# Patient Record
Sex: FEMALE | Race: WHITE | NOT HISPANIC OR LATINO | URBAN - METROPOLITAN AREA
[De-identification: names, ages, dates, MRNs, and addresses within clinical notes are randomized per-mention and may not be internally consistent; named-entity substitution may affect disease eponyms.]

---

## 2020-07-25 ENCOUNTER — TELEPHONE ENCOUNTER (OUTPATIENT)
Dept: URBAN - METROPOLITAN AREA CLINIC 13 | Facility: CLINIC | Age: 85
End: 2020-07-25

## 2020-07-25 RX ORDER — PANTOPRAZOLE SODIUM 20 MG
TAKE 1 TABLET DAILY TABLET, DELAYED RELEASE (ENTERIC COATED) ORAL
Refills: 0 | OUTPATIENT
Start: 2011-08-16 | End: 2011-10-13

## 2020-07-25 RX ORDER — LORAZEPAM 2 MG/1
TAKE  TABLET  1/2 QD TABLET ORAL
Refills: 0 | OUTPATIENT
Start: 2010-08-26 | End: 2011-10-13

## 2020-07-25 RX ORDER — PANTOPRAZOLE SODIUM 40 MG/1
TAKE 1 TABLET BY MOUTH ONCE DAILY TABLET, DELAYED RELEASE ORAL
Qty: 30 | Refills: 11 | OUTPATIENT
Start: 2016-12-29 | End: 2019-02-25

## 2020-07-25 RX ORDER — LISINOPRIL 20 MG/1
TAKE 1 TABLET DAILY TABLET ORAL
Refills: 0 | OUTPATIENT
End: 2016-02-19

## 2020-07-25 RX ORDER — CALCIUM CITRATE/VITAMIN D3 315MG-6.25
TAKE 1 TABLET DAILY TABLET ORAL
Refills: 0 | OUTPATIENT
Start: 2011-08-16 | End: 2011-10-13

## 2020-07-25 RX ORDER — CHLORDIAZEPOXIDE HYDROCHLORIDE AND CLIDINIUM BROMIDE 5; 2.5 MG/1; MG/1
TAKE 1 CAPSULE 3 TIMES DAILY AS NEEDED CAPSULE ORAL
Refills: 0 | OUTPATIENT
Start: 2008-11-17 | End: 2008-11-17

## 2020-07-25 RX ORDER — PREDNISONE 50 MG/1
TAKE AS DIRECTED TABLET ORAL
Refills: 0 | OUTPATIENT
Start: 2011-07-11 | End: 2011-10-13

## 2020-07-25 RX ORDER — FAMOTIDINE 40 MG/1
TAKE 1 TABLET DAILY TABLET ORAL
Refills: 0 | OUTPATIENT
End: 2016-02-19

## 2020-07-25 RX ORDER — ATENOLOL 50 MG
TAKE 1 TABLET DAILY TABLET ORAL
Refills: 0 | OUTPATIENT
Start: 2007-10-12 | End: 2008-09-24

## 2020-07-25 RX ORDER — LORAZEPAM 1 MG/1
TABLET ORAL
Qty: 30 | Refills: 0 | OUTPATIENT
Start: 2013-09-04 | End: 2016-02-19

## 2020-07-25 RX ORDER — FAMOTIDINE 40 MG/1
TAKE 1 TABLET TWICE DAILY TABLET, FILM COATED ORAL
Refills: 0 | OUTPATIENT
End: 2014-01-09

## 2020-07-25 RX ORDER — PANTOPRAZOLE SODIUM 40 MG/1
TAKE 1 TABLET DAILY TABLET, DELAYED RELEASE ORAL
Qty: 30 | Refills: 11 | OUTPATIENT
Start: 2012-07-10 | End: 2013-09-20

## 2020-07-25 RX ORDER — PANTOPRAZOLE SODIUM 40 MG/1
TAKE ONE TABLET DAILY TABLET, DELAYED RELEASE ORAL
Qty: 30 | Refills: 11 | OUTPATIENT
Start: 2013-01-18 | End: 2016-02-19

## 2020-07-25 RX ORDER — PANTOPRAZOLE SODIUM 40 MG
TAKE 1 TABLET DAILY TABLET, DELAYED RELEASE (ENTERIC COATED) ORAL
Qty: 30 | Refills: 11 | OUTPATIENT
End: 2016-09-08

## 2020-07-25 RX ORDER — DOXEPIN HYDROCHLORIDE 75 MG/1
TAKE 1 CAPSULE DAILY CAPSULE ORAL
Refills: 0 | OUTPATIENT
Start: 2006-11-01 | End: 2007-10-12

## 2020-07-25 RX ORDER — METOCLOPRAMIDE HYDROCHLORIDE 5 MG/1
TAKE 1 TABLET AS NEEDED TABLET ORAL
Refills: 0 | OUTPATIENT
Start: 2011-01-31 | End: 2011-01-31

## 2020-07-26 ENCOUNTER — TELEPHONE ENCOUNTER (OUTPATIENT)
Dept: URBAN - METROPOLITAN AREA CLINIC 13 | Facility: CLINIC | Age: 85
End: 2020-07-26

## 2020-07-26 RX ORDER — BUTALBITAL, ACETAMINOPHEN AND CAFFEINE 50; 325; 40 MG/1; MG/1; MG/1
TK 1 C PO  Q 6 H PRN CAPSULE ORAL
Qty: 30 | Refills: 0 | Status: ACTIVE | COMMUNITY
Start: 2018-12-13

## 2020-07-26 RX ORDER — DIAZEPAM 2 MG/1
TAKE 1 TABLET 3-4 TIMES DAILY TABLET ORAL
Refills: 0 | Status: ACTIVE | COMMUNITY

## 2020-07-26 RX ORDER — ONDANSETRON 4 MG/1
DISSOLVE 1-2 TABLETS UNDER THE TONGUE EVERY 8 HOURS AS NEEDED FOR NAUSEA TABLET, ORALLY DISINTEGRATING ORAL
Qty: 45 | Refills: 4 | Status: ACTIVE | COMMUNITY
Start: 2017-07-21

## 2020-07-26 RX ORDER — HYDROCODONE/ACETAMINOPHEN 10MG-325MG
TAKE 1 TABLET EVERY 4 HOURS AS NEEDED FOR PAIN TABLET ORAL
Refills: 0 | Status: ACTIVE | COMMUNITY

## 2020-07-26 RX ORDER — FAMOTIDINE 40 MG/1
TAKE 1 TABLET BEDTIME PRN INDIGESTION OR HEARTBURN TABLET ORAL
Qty: 30 | Refills: 11 | Status: ACTIVE | COMMUNITY
Start: 2016-09-08

## 2020-07-26 RX ORDER — HYDROCHLOROTHIAZIDE 12.5 MG/1
CAPSULE ORAL
Qty: 30 | Refills: 0 | Status: ACTIVE | COMMUNITY
Start: 2018-05-01

## 2020-07-26 RX ORDER — LUBIPROSTONE 24 UG/1
TAKE 1 CAPSULE BY MOUTH TWICE A DAY WITH FOOD CAPSULE, GELATIN COATED ORAL
Qty: 60 | Refills: 11 | Status: ACTIVE | COMMUNITY
Start: 2016-09-08

## 2020-07-26 RX ORDER — HYDROCHLOROTHIAZIDE 12.5 MG/1
TAKE 1 TABLET DAILY IN THE MORNING TABLET ORAL
Refills: 0 | Status: ACTIVE | COMMUNITY

## 2020-07-26 RX ORDER — PANTOPRAZOLE SODIUM 40 MG/1
TAKE 1 TABLET BY MOUTH DAILY TABLET, DELAYED RELEASE ORAL
Qty: 90 | Refills: 3 | Status: ACTIVE | COMMUNITY
Start: 2019-02-25

## 2020-07-26 RX ORDER — DOXYCYCLINE HYCLATE 50 MG/1
CAPSULE ORAL
Qty: 14 | Refills: 0 | Status: ACTIVE | COMMUNITY
Start: 2019-01-23

## 2020-07-26 RX ORDER — LISINOPRIL 20 MG/1
TAKE 1 TABLET DAILY TABLET ORAL
Qty: 30 | Refills: 0 | Status: ACTIVE | COMMUNITY

## 2020-08-31 ENCOUNTER — ERX REFILL RESPONSE (OUTPATIENT)
Age: 85
End: 2020-08-31

## 2020-08-31 RX ORDER — ONDANSETRON 4 MG/1
DISSOLVE 1-2 TABLETS UNDER THE TONGUE EVERY 8 HOURS AS NEEDED FOR NAUSEA TABLET, ORALLY DISINTEGRATING ORAL
Qty: 45 | Refills: 0

## 2020-09-28 ENCOUNTER — ERX REFILL RESPONSE (OUTPATIENT)
Age: 85
End: 2020-09-28

## 2020-09-28 RX ORDER — FAMOTIDINE 40 MG/1
TAKE 1 TABLET BY MOUTH TWICE DAILY TABLET, FILM COATED ORAL
Qty: 180 | Refills: 0

## 2020-12-15 ENCOUNTER — TELEPHONE ENCOUNTER (OUTPATIENT)
Dept: URBAN - METROPOLITAN AREA CLINIC 113 | Facility: CLINIC | Age: 85
End: 2020-12-15

## 2020-12-15 RX ORDER — ONDANSETRON 4 MG/1
DISSOLVE 1-2 TABLETS UNDER THE TONGUE EVERY 8 HOURS AS NEEDED FOR NAUSEA TABLET, ORALLY DISINTEGRATING ORAL
Qty: 30 | Refills: 1

## 2021-04-29 ENCOUNTER — WEB ENCOUNTER (OUTPATIENT)
Dept: URBAN - METROPOLITAN AREA CLINIC 113 | Facility: CLINIC | Age: 86
End: 2021-04-29

## 2021-04-29 ENCOUNTER — OFFICE VISIT (OUTPATIENT)
Dept: URBAN - METROPOLITAN AREA CLINIC 113 | Facility: CLINIC | Age: 86
End: 2021-04-29
Payer: MEDICARE

## 2021-04-29 VITALS
SYSTOLIC BLOOD PRESSURE: 183 MMHG | TEMPERATURE: 98 F | HEIGHT: 61 IN | HEART RATE: 67 BPM | RESPIRATION RATE: 20 BRPM | BODY MASS INDEX: 22.28 KG/M2 | WEIGHT: 118 LBS | DIASTOLIC BLOOD PRESSURE: 79 MMHG

## 2021-04-29 DIAGNOSIS — R19.7 DIARRHEA: ICD-10-CM

## 2021-04-29 DIAGNOSIS — R15.9 INCONTINENCE OF FECES: ICD-10-CM

## 2021-04-29 PROCEDURE — 99214 OFFICE O/P EST MOD 30 MIN: CPT | Performed by: INTERNAL MEDICINE

## 2021-04-29 RX ORDER — DICYCLOMINE HYDROCHLORIDE 10 MG/1
1 TABLET CAPSULE ORAL THREE TIMES A DAY
Status: ACTIVE | COMMUNITY

## 2021-04-29 RX ORDER — HYDROCHLOROTHIAZIDE 12.5 MG/1
CAPSULE ORAL
Qty: 30 | Refills: 0 | Status: DISCONTINUED | COMMUNITY
Start: 2018-05-01

## 2021-04-29 RX ORDER — HYDROCODONE/ACETAMINOPHEN 10MG-325MG
TAKE 1 TABLET EVERY 4 HOURS AS NEEDED FOR PAIN TABLET ORAL
Refills: 0 | Status: ACTIVE | COMMUNITY

## 2021-04-29 RX ORDER — ONDANSETRON 4 MG/1
DISSOLVE 1-2 TABLETS UNDER THE TONGUE EVERY 8 HOURS AS NEEDED FOR NAUSEA TABLET, ORALLY DISINTEGRATING ORAL
Qty: 30 | Refills: 1 | Status: ACTIVE | COMMUNITY

## 2021-04-29 RX ORDER — PANTOPRAZOLE SODIUM 40 MG/1
TAKE 1 TABLET BY MOUTH DAILY TABLET, DELAYED RELEASE ORAL
Qty: 90 | Refills: 3 | Status: ACTIVE | COMMUNITY
Start: 2019-02-25

## 2021-04-29 RX ORDER — BUTALBITAL, ACETAMINOPHEN AND CAFFEINE 50; 325; 40 MG/1; MG/1; MG/1
TK 1 C PO  Q 6 H PRN CAPSULE ORAL
Qty: 30 | Refills: 0 | Status: ACTIVE | COMMUNITY
Start: 2018-12-13

## 2021-04-29 RX ORDER — DOXYCYCLINE HYCLATE 50 MG/1
CAPSULE ORAL
Qty: 14 | Refills: 0 | Status: DISCONTINUED | COMMUNITY
Start: 2019-01-23

## 2021-04-29 RX ORDER — ATENOLOL 25 MG/1
1 TABLET TABLET ORAL ONCE A DAY
Status: ACTIVE | COMMUNITY

## 2021-04-29 RX ORDER — FAMOTIDINE 40 MG/1
TAKE 1 TABLET BY MOUTH TWICE DAILY TABLET, FILM COATED ORAL
Qty: 180 | Refills: 0 | Status: ACTIVE | COMMUNITY

## 2021-04-29 RX ORDER — HYDROCHLOROTHIAZIDE 12.5 MG/1
TAKE 1 TABLET DAILY IN THE MORNING TABLET ORAL
Refills: 0 | Status: DISCONTINUED | COMMUNITY

## 2021-04-29 RX ORDER — LUBIPROSTONE 24 UG/1
TAKE 1 CAPSULE BY MOUTH TWICE A DAY WITH FOOD CAPSULE, GELATIN COATED ORAL
Qty: 60 | Refills: 11 | Status: DISCONTINUED | COMMUNITY
Start: 2016-09-08

## 2021-04-29 RX ORDER — DIAZEPAM 2 MG/1
TAKE 1 TABLET 3-4 TIMES DAILY TABLET ORAL
Refills: 0 | Status: DISCONTINUED | COMMUNITY

## 2021-04-29 RX ORDER — LISINOPRIL 20 MG/1
TAKE 1 TABLET DAILY TABLET ORAL
Qty: 30 | Refills: 0 | Status: ACTIVE | COMMUNITY

## 2021-04-29 NOTE — HPI-OTHER HISTORIES
Barium swallow 11/6/18: revealed mild esophageal dysmotility. Colonoscopy 2011: Excellent prep, sigmoid diverticulosis, moderate nonbleeding internal hemorrhoids. EGD 2013: Benign-appearing distal esophageal ring status post dilatation 46 [place], mild distal esophageal concentric rings, multiple fundic gland polyps, small hiatal hernia, otherwise normal to the second part of the duodenum. Esophageal biopsies negative for intestinal metaplasia. Gastric biopsies negative for H. pylori.

## 2021-04-29 NOTE — HPI-TODAY'S VISIT:
89-year-old female with history of GERD, constipation predominant irritable bowel syndrome, presenting for evaluation of diarrhea. She was last seen 2/25/2019 for follow-up regarding GERD and nausea, which were managed with pantoprazole and famotidine, with Zofran as needed.  Her chronic constipation was managed with milk of magnesia.  Dicyclomine was provided to use as needed for relief of abdominal pain. She returns today with complaint of a 3 week history of diarrhea. She is having 5-6 watery stools per day with associated nocturnal defecation. She complains of frequent fecal leakage, requiring her to wear a pad. No blood per rectum. She has diffuse abdominal discomfort prior to a bowel movement, which subsides with defecation. She has occasional nausea without vomiting. She discontinued her milk of magnesia at onset, and has taken Imodium 3x without improvement. She started oral Augmentin and topical erythromycin one week ago for an eye infection, but the diarrhea began prior to iniitation of the antibiotics. She otherwise denies recent medication changes or sick contacts. Her  accompanies her to the visit.

## 2021-05-05 ENCOUNTER — TELEPHONE ENCOUNTER (OUTPATIENT)
Dept: URBAN - METROPOLITAN AREA CLINIC 113 | Facility: CLINIC | Age: 86
End: 2021-05-05

## 2021-05-05 LAB
ADENOVIRUS F 40/41: NOT DETECTED
ASTROVIRUS: NOT DETECTED
C DIFFICILE TOXIN A/B: NOT DETECTED
CALPROTECTIN, FECAL: <16
CAMPYLOBACTER: NOT DETECTED
CRYPTOSPORIDIUM: NOT DETECTED
CYCLOSPORA CAYETANENSIS: NOT DETECTED
E COLI O157: (no result)
ENTAMOEBA HISTOLYTICA: NOT DETECTED
ENTEROAGGREGATIVE E COLI: NOT DETECTED
ENTEROPATHOGENIC E COLI: NOT DETECTED
ENTEROTOXIGENIC E COLI: NOT DETECTED
GIARDIA LAMBLIA: NOT DETECTED
NOROVIRUS GI/GII: NOT DETECTED
PLESIOMONAS SHIGELLOIDES: NOT DETECTED
ROTAVIRUS A: NOT DETECTED
SALMONELLA: NOT DETECTED
SAPOVIRUS: NOT DETECTED
SHIGA-TOXIN-PRODUCING E COLI: NOT DETECTED
SHIGELLA/ENTEROINVASIVE E COLI: NOT DETECTED
VIBRIO CHOLERAE: NOT DETECTED
VIBRIO: NOT DETECTED
YERSINIA ENTEROCOLITICA: NOT DETECTED

## 2021-05-05 RX ORDER — FAMOTIDINE 40 MG/1
TAKE 1 TABLET BY MOUTH TWICE DAILY TABLET, FILM COATED ORAL
Qty: 180 | Refills: 0 | Status: ACTIVE | COMMUNITY

## 2021-05-05 RX ORDER — PANTOPRAZOLE SODIUM 40 MG/1
TAKE 1 TABLET BY MOUTH DAILY TABLET, DELAYED RELEASE ORAL
Qty: 90 | Refills: 3 | Status: ACTIVE | COMMUNITY
Start: 2019-02-25

## 2021-05-05 RX ORDER — ATENOLOL 25 MG/1
1 TABLET TABLET ORAL ONCE A DAY
Status: ACTIVE | COMMUNITY

## 2021-05-05 RX ORDER — HYDROCODONE/ACETAMINOPHEN 10MG-325MG
TAKE 1 TABLET EVERY 4 HOURS AS NEEDED FOR PAIN TABLET ORAL
Refills: 0 | Status: ACTIVE | COMMUNITY

## 2021-05-05 RX ORDER — LISINOPRIL 20 MG/1
TAKE 1 TABLET DAILY TABLET ORAL
Qty: 30 | Refills: 0 | Status: ACTIVE | COMMUNITY

## 2021-05-05 RX ORDER — BUTALBITAL, ACETAMINOPHEN AND CAFFEINE 50; 325; 40 MG/1; MG/1; MG/1
TK 1 C PO  Q 6 H PRN CAPSULE ORAL
Qty: 30 | Refills: 0 | Status: ACTIVE | COMMUNITY
Start: 2018-12-13

## 2021-05-05 RX ORDER — SODIUM, POTASSIUM,MAG SULFATES 17.5-3.13G
354 ML SOLUTION, RECONSTITUTED, ORAL ORAL ONCE
Qty: 354 MILLILITER | Refills: 0 | OUTPATIENT
Start: 2021-05-05 | End: 2021-05-06

## 2021-05-05 RX ORDER — ONDANSETRON 4 MG/1
DISSOLVE 1-2 TABLETS UNDER THE TONGUE EVERY 8 HOURS AS NEEDED FOR NAUSEA TABLET, ORALLY DISINTEGRATING ORAL
Qty: 30 | Refills: 1 | Status: ACTIVE | COMMUNITY

## 2021-05-05 RX ORDER — DICYCLOMINE HYDROCHLORIDE 10 MG/1
1 TABLET CAPSULE ORAL THREE TIMES A DAY
Status: ACTIVE | COMMUNITY

## 2021-05-12 ENCOUNTER — TELEPHONE ENCOUNTER (OUTPATIENT)
Dept: URBAN - METROPOLITAN AREA CLINIC 113 | Facility: CLINIC | Age: 86
End: 2021-05-12

## 2021-05-12 RX ORDER — DICYCLOMINE HYDROCHLORIDE 10 MG/1
1 TABLET CAPSULE ORAL
Qty: 45 | Refills: 2

## 2021-05-13 ENCOUNTER — TELEPHONE ENCOUNTER (OUTPATIENT)
Dept: URBAN - METROPOLITAN AREA CLINIC 72 | Facility: CLINIC | Age: 86
End: 2021-05-13

## 2021-05-13 RX ORDER — ONDANSETRON 4 MG/1
PLACE 1 TABLET ON THE TONGUE AND ALLOW TO DISSOLVE, EVERY 4-6 HOURS AS NEEDED FOR NAUSEA TABLET, ORALLY DISINTEGRATING ORAL
Qty: 45 TABLET | Refills: 1 | OUTPATIENT
Start: 2021-05-14

## 2021-05-26 ENCOUNTER — TELEPHONE ENCOUNTER (OUTPATIENT)
Dept: URBAN - METROPOLITAN AREA SURGERY CENTER 25 | Facility: SURGERY CENTER | Age: 86
End: 2021-05-26

## 2021-05-28 ENCOUNTER — CLAIMS CREATED FROM THE CLAIM WINDOW (OUTPATIENT)
Dept: URBAN - METROPOLITAN AREA CLINIC 4 | Facility: CLINIC | Age: 86
End: 2021-05-28
Payer: MEDICARE

## 2021-05-28 ENCOUNTER — TELEPHONE ENCOUNTER (OUTPATIENT)
Dept: URBAN - METROPOLITAN AREA CLINIC 113 | Facility: CLINIC | Age: 86
End: 2021-05-28

## 2021-05-28 ENCOUNTER — OFFICE VISIT (OUTPATIENT)
Dept: URBAN - METROPOLITAN AREA SURGERY CENTER 25 | Facility: SURGERY CENTER | Age: 86
End: 2021-05-28
Payer: MEDICARE

## 2021-05-28 DIAGNOSIS — K63.89 JEJUNAL POLYP: ICD-10-CM

## 2021-05-28 DIAGNOSIS — K52.9 CHRONIC DIARRHEA: ICD-10-CM

## 2021-05-28 PROCEDURE — G8907 PT DOC NO EVENTS ON DISCHARG: HCPCS | Performed by: INTERNAL MEDICINE

## 2021-05-28 PROCEDURE — 88305 TISSUE EXAM BY PATHOLOGIST: CPT | Performed by: PATHOLOGY

## 2021-05-28 PROCEDURE — 45380 COLONOSCOPY AND BIOPSY: CPT | Performed by: INTERNAL MEDICINE

## 2021-05-28 RX ORDER — ONDANSETRON 4 MG/1
PLACE 1 TABLET ON THE TONGUE AND ALLOW TO DISSOLVE, EVERY 4-6 HOURS AS NEEDED FOR NAUSEA TABLET, ORALLY DISINTEGRATING ORAL
Qty: 45 TABLET | Refills: 1 | Status: ACTIVE | COMMUNITY
Start: 2021-05-14

## 2021-05-28 RX ORDER — DICYCLOMINE HYDROCHLORIDE 10 MG/1
1 TABLET CAPSULE ORAL
Qty: 45 | Refills: 2 | Status: ACTIVE | COMMUNITY

## 2021-05-28 RX ORDER — BUTALBITAL, ACETAMINOPHEN AND CAFFEINE 50; 325; 40 MG/1; MG/1; MG/1
TK 1 C PO  Q 6 H PRN CAPSULE ORAL
Qty: 30 | Refills: 0 | Status: ACTIVE | COMMUNITY
Start: 2018-12-13

## 2021-05-28 RX ORDER — LISINOPRIL 20 MG/1
TAKE 1 TABLET DAILY TABLET ORAL
Qty: 30 | Refills: 0 | Status: ACTIVE | COMMUNITY

## 2021-05-28 RX ORDER — ONDANSETRON 4 MG/1
DISSOLVE 1-2 TABLETS UNDER THE TONGUE EVERY 8 HOURS AS NEEDED FOR NAUSEA TABLET, ORALLY DISINTEGRATING ORAL
Qty: 30 | Refills: 1 | Status: ACTIVE | COMMUNITY

## 2021-05-28 RX ORDER — PANTOPRAZOLE SODIUM 40 MG/1
TAKE 1 TABLET BY MOUTH DAILY TABLET, DELAYED RELEASE ORAL
Qty: 90 | Refills: 3 | Status: ACTIVE | COMMUNITY
Start: 2019-02-25

## 2021-05-28 RX ORDER — FAMOTIDINE 40 MG/1
TAKE 1 TABLET BY MOUTH TWICE DAILY TABLET, FILM COATED ORAL
Qty: 180 | Refills: 0 | Status: ACTIVE | COMMUNITY

## 2021-05-28 RX ORDER — HYDROCODONE/ACETAMINOPHEN 10MG-325MG
TAKE 1 TABLET EVERY 4 HOURS AS NEEDED FOR PAIN TABLET ORAL
Refills: 0 | Status: ACTIVE | COMMUNITY

## 2021-05-28 RX ORDER — ATENOLOL 25 MG/1
1 TABLET TABLET ORAL ONCE A DAY
Status: ACTIVE | COMMUNITY

## 2021-06-10 ENCOUNTER — OFFICE VISIT (OUTPATIENT)
Dept: URBAN - METROPOLITAN AREA CLINIC 113 | Facility: CLINIC | Age: 86
End: 2021-06-10

## 2021-06-10 RX ORDER — ONDANSETRON 4 MG/1
DISSOLVE 1-2 TABLETS UNDER THE TONGUE EVERY 8 HOURS AS NEEDED FOR NAUSEA TABLET, ORALLY DISINTEGRATING ORAL
Qty: 30 | Refills: 1 | Status: ACTIVE | COMMUNITY

## 2021-06-10 RX ORDER — DICYCLOMINE HYDROCHLORIDE 10 MG/1
1 TABLET CAPSULE ORAL
Qty: 45 | Refills: 2 | Status: ACTIVE | COMMUNITY

## 2021-06-10 RX ORDER — ONDANSETRON 4 MG/1
PLACE 1 TABLET ON THE TONGUE AND ALLOW TO DISSOLVE, EVERY 4-6 HOURS AS NEEDED FOR NAUSEA TABLET, ORALLY DISINTEGRATING ORAL
Qty: 45 TABLET | Refills: 1 | Status: ACTIVE | COMMUNITY
Start: 2021-05-14

## 2021-06-10 RX ORDER — BUTALBITAL, ACETAMINOPHEN AND CAFFEINE 50; 325; 40 MG/1; MG/1; MG/1
TK 1 C PO  Q 6 H PRN CAPSULE ORAL
Qty: 30 | Refills: 0 | Status: ACTIVE | COMMUNITY
Start: 2018-12-13

## 2021-06-10 RX ORDER — LISINOPRIL 20 MG/1
TAKE 1 TABLET DAILY TABLET ORAL
Qty: 30 | Refills: 0 | Status: ACTIVE | COMMUNITY

## 2021-06-10 RX ORDER — FAMOTIDINE 40 MG/1
TAKE 1 TABLET BY MOUTH TWICE DAILY TABLET, FILM COATED ORAL
Qty: 180 | Refills: 0 | Status: ACTIVE | COMMUNITY

## 2021-06-10 RX ORDER — ATENOLOL 25 MG/1
1 TABLET TABLET ORAL ONCE A DAY
Status: ACTIVE | COMMUNITY

## 2021-06-10 RX ORDER — HYDROCODONE/ACETAMINOPHEN 10MG-325MG
TAKE 1 TABLET EVERY 4 HOURS AS NEEDED FOR PAIN TABLET ORAL
Refills: 0 | Status: ACTIVE | COMMUNITY

## 2021-06-10 RX ORDER — PANTOPRAZOLE SODIUM 40 MG/1
TAKE 1 TABLET BY MOUTH DAILY TABLET, DELAYED RELEASE ORAL
Qty: 90 | Refills: 3 | Status: ACTIVE | COMMUNITY
Start: 2019-02-25

## 2021-06-11 ENCOUNTER — OFFICE VISIT (OUTPATIENT)
Dept: URBAN - METROPOLITAN AREA CLINIC 107 | Facility: CLINIC | Age: 86
End: 2021-06-11

## 2021-06-14 ENCOUNTER — OFFICE VISIT (OUTPATIENT)
Dept: URBAN - METROPOLITAN AREA CLINIC 113 | Facility: CLINIC | Age: 86
End: 2021-06-14
Payer: MEDICARE

## 2021-06-14 VITALS
HEIGHT: 61 IN | DIASTOLIC BLOOD PRESSURE: 77 MMHG | BODY MASS INDEX: 21.79 KG/M2 | WEIGHT: 115.4 LBS | TEMPERATURE: 97.7 F | RESPIRATION RATE: 22 BRPM | SYSTOLIC BLOOD PRESSURE: 179 MMHG | HEART RATE: 60 BPM

## 2021-06-14 DIAGNOSIS — K21.9 GERD WITHOUT ESOPHAGITIS: ICD-10-CM

## 2021-06-14 DIAGNOSIS — K58.1 IRRITABLE BOWEL SYNDROME WITH CONSTIPATION: ICD-10-CM

## 2021-06-14 DIAGNOSIS — K22.2 ESOPHAGEAL RING: ICD-10-CM

## 2021-06-14 DIAGNOSIS — R19.7 DIARRHEA: ICD-10-CM

## 2021-06-14 DIAGNOSIS — R15.9 INCONTINENCE OF FECES: ICD-10-CM

## 2021-06-14 PROBLEM — 266435005: Status: ACTIVE | Noted: 2021-06-14

## 2021-06-14 PROBLEM — 72042002 INCONTINENCE OF FECES: Status: ACTIVE | Noted: 2021-04-29

## 2021-06-14 PROBLEM — 300286002: Status: ACTIVE | Noted: 2021-06-14

## 2021-06-14 PROBLEM — 62315008 DIARRHEA: Status: ACTIVE | Noted: 2021-05-28

## 2021-06-14 PROBLEM — 440630006: Status: ACTIVE | Noted: 2021-06-14

## 2021-06-14 PROCEDURE — 99213 OFFICE O/P EST LOW 20 MIN: CPT | Performed by: INTERNAL MEDICINE

## 2021-06-14 RX ORDER — ONDANSETRON 4 MG/1
PLACE 1 TABLET ON THE TONGUE AND ALLOW TO DISSOLVE, EVERY 4-6 HOURS AS NEEDED FOR NAUSEA TABLET, ORALLY DISINTEGRATING ORAL
Qty: 45 TABLET | Refills: 1 | Status: ACTIVE | COMMUNITY
Start: 2021-05-14

## 2021-06-14 RX ORDER — HYDROCODONE/ACETAMINOPHEN 10MG-325MG
TAKE 1 TABLET EVERY 4 HOURS AS NEEDED FOR PAIN TABLET ORAL
Refills: 0 | Status: ACTIVE | COMMUNITY

## 2021-06-14 RX ORDER — DICYCLOMINE HYDROCHLORIDE 10 MG/1
1 TABLET CAPSULE ORAL
Qty: 45 | Refills: 2 | Status: ACTIVE | COMMUNITY

## 2021-06-14 RX ORDER — BUTALBITAL, ACETAMINOPHEN AND CAFFEINE 50; 325; 40 MG/1; MG/1; MG/1
TK 1 C PO  Q 6 H PRN CAPSULE ORAL
Qty: 30 | Refills: 0 | Status: ACTIVE | COMMUNITY
Start: 2018-12-13

## 2021-06-14 RX ORDER — ONDANSETRON 4 MG/1
DISSOLVE 1-2 TABLETS UNDER THE TONGUE EVERY 8 HOURS AS NEEDED FOR NAUSEA TABLET, ORALLY DISINTEGRATING ORAL
Qty: 30 | Refills: 1 | Status: ACTIVE | COMMUNITY

## 2021-06-14 RX ORDER — FAMOTIDINE 40 MG/1
TAKE 1 TABLET BY MOUTH TWICE DAILY TABLET, FILM COATED ORAL
Qty: 180 | Refills: 0 | Status: ON HOLD | COMMUNITY

## 2021-06-14 RX ORDER — LISINOPRIL 20 MG/1
TAKE 1 TABLET DAILY TABLET ORAL
Qty: 30 | Refills: 0 | Status: ACTIVE | COMMUNITY

## 2021-06-14 RX ORDER — ATENOLOL 25 MG/1
1 TABLET TABLET ORAL ONCE A DAY
Status: ACTIVE | COMMUNITY

## 2021-06-14 RX ORDER — PANTOPRAZOLE SODIUM 40 MG/1
TAKE 1 TABLET BY MOUTH DAILY TABLET, DELAYED RELEASE ORAL
Qty: 90 | Refills: 3 | Status: ACTIVE | COMMUNITY
Start: 2019-02-25

## 2021-06-14 NOTE — HPI-TODAY'S VISIT:
This is a 89-year-old female with a history of GERD, constipation predominant irritable bowel syndrome, and diarrhea predominant change in bowel habits associated with fecal incontinence and nocturnal defecation presenting for follow-up.   She was last seen 4/29/2021 reporting a 3-week history of diarrhea describing 5-6 watery bowel movements per day with associated nocturnal defecation and small-volume fecal incontinence.  She reported diffuse abdominal discomfort prior to bowel movements which subsided after defecation and occasional nausea without vomiting.  She had discontinued milk of magnesia at onset and had taken Imodium intermittently without improvement.  She had started oral Augmentin and topical erythromycin a week prior to her visit but the diarrhea began prior to initiation of the antibiotics.  Stool studies were ordered and she was instructed to continue daily fiber and continue antidiarrheals as needed.  Stool studies were negative.  She reported persistent diarrhea when she was notified of lab results.  She was scheduled for a colonoscopy; results below.  She reports resolution of diarrhea.  Her bowel habits have returned to constipation.  She is having a bowel movement once a week or less.  She is taking 2 Senokot as needed and, if this is not effective, she takes milk of magnesia.  She usually requires this less than once a week.  She tried MiraLAX in the past but admittedly took only a single dose.  This was ineffective.  She has generalized abdominal pain that improves after a bowel movement.  Abdominal pain is managed with dicyclomine which she usually requires twice a day.  She has diet dependent heartburn and reflux.  She is taking daily PPI but cannot recall whether she is taking pantoprazole or omeprazole.  She may have symptoms frequently within a week or may not have symptoms at all for a week.  She admits occasional difficulty swallowing describing solid material lodging at the base of her throat relieved with coughing or time.  She manages primarily with eating slowly.  She does not feel as though she is in need of dilation.

## 2021-06-14 NOTE — HPI-OTHER HISTORIES
Colonoscopy 5/28/2021: Excellent bowel prep, moderate nonbleeding internal hemorrhoids, significantly tortuous colon, mucosa was normal to the cecum.  Random biopsies were obtained and demonstrated no significant abnormality. EGD 2013: Benign-appearing distal esophageal ring status post dilatation 46 [place], mild distal esophageal concentric rings, multiple fundic gland polyps, small hiatal hernia, otherwise normal to the second part of the duodenum. Esophageal biopsies negative for intestinal metaplasia. Gastric biopsies negative for H. pylori. Colonoscopy 2011: Excellent prep, sigmoid diverticulosis, moderate nonbleeding internal hemorrhoids. Barium swallow 11/6/18: revealed mild esophageal dysmotility.

## 2021-06-15 ENCOUNTER — DASHBOARD ENCOUNTERS (OUTPATIENT)
Age: 86
End: 2021-06-15

## 2021-06-29 ENCOUNTER — ERX REFILL RESPONSE (OUTPATIENT)
Dept: URBAN - METROPOLITAN AREA CLINIC 113 | Facility: CLINIC | Age: 86
End: 2021-06-29

## 2021-06-29 RX ORDER — FAMOTIDINE 40 MG/1
TAKE 1 TABLET BY MOUTH TWICE DAILY TABLET ORAL
Qty: 180 TABLET | Refills: 1 | OUTPATIENT

## 2021-06-29 RX ORDER — FAMOTIDINE 40 MG/1
TAKE 1 TABLET BY MOUTH TWICE DAILY TABLET, FILM COATED ORAL
Qty: 180 | Refills: 0 | OUTPATIENT